# Patient Record
Sex: FEMALE | Race: WHITE | NOT HISPANIC OR LATINO
[De-identification: names, ages, dates, MRNs, and addresses within clinical notes are randomized per-mention and may not be internally consistent; named-entity substitution may affect disease eponyms.]

---

## 2023-03-30 ENCOUNTER — NON-APPOINTMENT (OUTPATIENT)
Age: 33
End: 2023-03-30

## 2023-03-30 ENCOUNTER — TRANSCRIPTION ENCOUNTER (OUTPATIENT)
Age: 33
End: 2023-03-30

## 2023-03-30 ENCOUNTER — APPOINTMENT (OUTPATIENT)
Dept: OBGYN | Facility: CLINIC | Age: 33
End: 2023-03-30
Payer: COMMERCIAL

## 2023-03-30 VITALS
WEIGHT: 125 LBS | SYSTOLIC BLOOD PRESSURE: 110 MMHG | DIASTOLIC BLOOD PRESSURE: 70 MMHG | HEIGHT: 67 IN | BODY MASS INDEX: 19.62 KG/M2

## 2023-03-30 DIAGNOSIS — Z00.00 ENCOUNTER FOR GENERAL ADULT MEDICAL EXAMINATION W/OUT ABNORMAL FINDINGS: ICD-10-CM

## 2023-03-30 DIAGNOSIS — Z11.3 ENCOUNTER FOR SCREENING FOR INFECTIONS WITH A PREDOMINANTLY SEXUAL MODE OF TRANSMISSION: ICD-10-CM

## 2023-03-30 DIAGNOSIS — Z01.419 ENCOUNTER FOR GYNECOLOGICAL EXAMINATION (GENERAL) (ROUTINE) W/OUT ABNORMAL FINDINGS: ICD-10-CM

## 2023-03-30 PROCEDURE — 99385 PREV VISIT NEW AGE 18-39: CPT

## 2023-03-30 NOTE — PHYSICAL EXAM
[Appropriately responsive] : appropriately responsive [Alert] : alert [No Acute Distress] : no acute distress [No Lymphadenopathy] : no lymphadenopathy [Soft] : soft [Non-tender] : non-tender [Non-distended] : non-distended [No Mass] : no mass [Oriented x3] : oriented x3 [Examination Of The Breasts] : a normal appearance [No Discharge] : no discharge [No Masses] : no breast masses were palpable [No Lesions] : no lesions  [Labia Majora] : normal [Labia Minora] : normal [No Bleeding] : There was no active vaginal bleeding [Normal] : normal [Normal Position] : in a normal position [Uterine Adnexae] : normal [Tenderness] : nontender [Enlarged ___ wks] : not enlarged [Mass ___ cm] : no uterine mass was palpated

## 2023-03-30 NOTE — PROCEDURE
[Cervical Pap Smear] : cervical Pap smear [Liquid Base] : liquid base [GC & Chlamydia via Pap] : GC & Chlamydia via Pap [Tolerated Well] : the patient tolerated the procedure well [No Complications] : there were no complications [de-identified] : trich

## 2023-03-30 NOTE — PLAN
[FreeTextEntry1] : Pap with G/C/T ordered\par Serum STI screen sent\par Discussed Pap guidelines \par Discussed self breast awareness, CBE and mammogram guidelines\par Mammogram due at 42 yo\par Reviewed Martin with return demonstration\par Contraception: condoms\par Condoms for STI prevention\par F/U pending results\par RTC for routine GYN exam in one year or PRN\par \par \par

## 2023-03-30 NOTE — HISTORY OF PRESENT ILLNESS
[Patient reported PAP Smear was normal] : Patient reported PAP Smear was normal [Y] : Patient is sexually active [Monogamous (Male Partner)] : is monogamous with a male partner [N] : Patient denies prior pregnancies [Normal Amount/Duration] :  normal amount and duration [Regular Cycle Intervals] : periods have been regular [Menarche Age: ____] : age at menarche was [unfilled] [Currently Active] : currently active [Men] : men [No] : No [HIV Test offered] : HIV Test offered [Syphilis test offered] : Syphilis test offered [Gonorrhea test offered] : Gonorrhea test offered [Chlamydia test offered] : Chlamydia test offered [Trichomonas test offered] : Trichomonas test offered [HPV test offered] : HPV test offered [Hepatitis B test offered] : Hepatitis B test offered [Hepatitis C test offered] : Hepatitis C test offered [Patient would like to be screened for STIs] : Patient would like to be screened for STIs [PapSmeardate] : 11/2021 [TextBox_31] : Patient reports normal [LMPDate] : 3/6/23 [MensesFreq] : 28 [MensesLength] : 5 [MensesAmount] : moderate [PGHxTotal] : 0 [FreeTextEntry1] : 3/6/23

## 2023-03-31 LAB
C TRACH RRNA SPEC QL NAA+PROBE: NOT DETECTED
HBV SURFACE AG SER QL: NONREACTIVE
HCV AB SER QL: NONREACTIVE
HCV S/CO RATIO: 0.11 S/CO
HIV1+2 AB SPEC QL IA.RAPID: NONREACTIVE
HPV HIGH+LOW RISK DNA PNL CVX: NOT DETECTED
N GONORRHOEA RRNA SPEC QL NAA+PROBE: NOT DETECTED
SOURCE AMPLIFICATION: NORMAL
SOURCE TP AMPLIFICATION: NORMAL
T PALLIDUM AB SER QL IA: NEGATIVE
T VAGINALIS RRNA SPEC QL NAA+PROBE: NOT DETECTED

## 2023-04-10 LAB — CYTOLOGY CVX/VAG DOC THIN PREP: NORMAL

## 2024-01-30 ENCOUNTER — APPOINTMENT (OUTPATIENT)
Dept: OBGYN | Facility: CLINIC | Age: 34
End: 2024-01-30
Payer: COMMERCIAL

## 2024-01-30 VITALS
OXYGEN SATURATION: 97 % | HEART RATE: 80 BPM | WEIGHT: 131 LBS | SYSTOLIC BLOOD PRESSURE: 100 MMHG | DIASTOLIC BLOOD PRESSURE: 60 MMHG | BODY MASS INDEX: 20.52 KG/M2

## 2024-01-30 PROCEDURE — 76830 TRANSVAGINAL US NON-OB: CPT

## 2024-01-30 PROCEDURE — 99202 OFFICE O/P NEW SF 15 MIN: CPT | Mod: 25

## 2024-01-30 NOTE — END OF VISIT
[] : Resident [FreeTextEntry3] : DIETARY CHANGES, EXERCISE MODIFICATIONS, PNV W/ DHA RECOMMENDED, ZIKA/TRAVEL RESTRICTIONS, COVID VACCINE DISCUSSED PRENATAL LABS URINE CULTURE VAGINAL CULTURES COLLECTED REFERRAL FOR MFM [facility]

## 2024-01-30 NOTE — PROCEDURE
[FreeTextEntry1] : Viable IUP. 1 fetus. Size c/w dates. Difference of only 1 day from LMP, so will use LMP for JORDAN confirmation.

## 2024-01-30 NOTE — HISTORY OF PRESENT ILLNESS
[Patient reported PAP Smear was normal] : Patient reported PAP Smear was normal [N] : Patient does not use contraception [Monogamous (Male Partner)] : is monogamous with a male partner [Y] : Positive pregnancy history [Normal Amount/Duration] :  normal amount and duration [Regular Cycle Intervals] : periods have been regular [Currently Active] : currently active [Men] : men [No] : No [PapSmeardate] : 3/30/23 [LMPDate] : 12/06/23 [MensesFreq] : 28 [MensesLength] : 5 [PGxTotal] : 1 [PGHxFullTerm] : 0 [PGHxPremature] : 0 [PGHxAbortions] : 0 [[facility]] : 0 [PGHxABInduced] : 0 [PGHxABSpont] : 0 [PGHxEctopic] : 0 [PGHxMultBirths] : 0 [FreeTextEntry1] : 12/06/23

## 2024-01-30 NOTE — PLAN
[FreeTextEntry1] : 33 y.o.  @7w6d by LMP (JORDAN 24) for initial prenatal visit found to have viable IUP. Pregnancy counseling preformed and all questions answered.  - Prenatal labs drawn - [facility] unit for NT scan - NIPT at next visit - RTC in 4w for routine PNC  Lindsey Tom MD PGY4 LARISSA Kaiser MD

## 2024-01-31 ENCOUNTER — NON-APPOINTMENT (OUTPATIENT)
Age: 34
End: 2024-01-31

## 2024-01-31 LAB
ABO + RH PNL BLD: NORMAL
ALBUMIN SERPL ELPH-MCNC: 4.6 G/DL
ALP BLD-CCNC: 42 U/L
ALT SERPL-CCNC: 14 U/L
ANION GAP SERPL CALC-SCNC: 11 MMOL/L
AST SERPL-CCNC: 14 U/L
BASOPHILS # BLD AUTO: 0.03 K/UL
BASOPHILS NFR BLD AUTO: 0.5 %
BILIRUB SERPL-MCNC: 0.3 MG/DL
BLD GP AB SCN SERPL QL: NORMAL
BUN SERPL-MCNC: 9 MG/DL
C TRACH RRNA SPEC QL NAA+PROBE: NOT DETECTED
CALCIUM SERPL-MCNC: 9.7 MG/DL
CHLORIDE SERPL-SCNC: 102 MMOL/L
CO2 SERPL-SCNC: 22 MMOL/L
CREAT SERPL-MCNC: 0.55 MG/DL
EGFR: 125 ML/MIN/1.73M2
EOSINOPHIL # BLD AUTO: 0.06 K/UL
EOSINOPHIL NFR BLD AUTO: 0.9 %
ESTIMATED AVERAGE GLUCOSE: 94 MG/DL
GLUCOSE SERPL-MCNC: 80 MG/DL
HBA1C MFR BLD HPLC: 4.9 %
HCT VFR BLD CALC: 39.9 %
HGB A MFR BLD: 97.5 %
HGB A2 MFR BLD: 2.5 %
HGB BLD-MCNC: 12.4 G/DL
HGB FRACT BLD-IMP: NORMAL
HIV1+2 AB SPEC QL IA.RAPID: NONREACTIVE
IMM GRANULOCYTES NFR BLD AUTO: 0.3 %
LYMPHOCYTES # BLD AUTO: 1.29 K/UL
LYMPHOCYTES NFR BLD AUTO: 20.3 %
MAN DIFF?: NORMAL
MCHC RBC-ENTMCNC: 25.3 PG
MCHC RBC-ENTMCNC: 31.1 GM/DL
MCV RBC AUTO: 81.3 FL
MONOCYTES # BLD AUTO: 0.57 K/UL
MONOCYTES NFR BLD AUTO: 9 %
N GONORRHOEA RRNA SPEC QL NAA+PROBE: NOT DETECTED
NEUTROPHILS # BLD AUTO: 4.38 K/UL
NEUTROPHILS NFR BLD AUTO: 69 %
PLATELET # BLD AUTO: 175 K/UL
POTASSIUM SERPL-SCNC: 3.9 MMOL/L
PROT SERPL-MCNC: 7.4 G/DL
RBC # BLD: 4.91 M/UL
RBC # FLD: 15.5 %
SODIUM SERPL-SCNC: 136 MMOL/L
SOURCE AMPLIFICATION: NORMAL
WBC # FLD AUTO: 6.35 K/UL

## 2024-02-05 LAB
B19V IGG SER QL IA: 4.45 INDEX
B19V IGG+IGM SER-IMP: NORMAL
B19V IGG+IGM SER-IMP: POSITIVE
B19V IGM FLD-ACNC: 0.15 INDEX
B19V IGM SER-ACNC: NEGATIVE
BACTERIA UR CULT: NORMAL
CFTR MUT TESTED BLD/T: NEGATIVE
CMV IGG SERPL QL: <0.2 U/ML
CMV IGG SERPL-IMP: NEGATIVE
CMV IGM SERPL QL: <8 AU/ML
CMV IGM SERPL QL: NEGATIVE
HBV SURFACE AG SER QL: NONREACTIVE
HCV AB SER QL: NONREACTIVE
HCV S/CO RATIO: 0.11 S/CO
RUBV IGG FLD-ACNC: 2.6 INDEX
RUBV IGG SER-IMP: POSITIVE
T GONDII AB SER-IMP: NEGATIVE
T GONDII AB SER-IMP: NEGATIVE
T GONDII IGG SER QL: <3 IU/ML
T GONDII IGM SER QL: 3.4 AU/ML
T PALLIDUM AB SER QL IA: NEGATIVE

## 2024-02-06 LAB
AR GENE MUT ANL BLD/T: NORMAL
FMR1 GENE MUT ANL BLD/T: NORMAL

## 2024-02-20 ENCOUNTER — NON-APPOINTMENT (OUTPATIENT)
Age: 34
End: 2024-02-20

## 2024-02-26 ENCOUNTER — NON-APPOINTMENT (OUTPATIENT)
Age: 34
End: 2024-02-26

## 2024-02-27 ENCOUNTER — APPOINTMENT (OUTPATIENT)
Dept: OBGYN | Facility: CLINIC | Age: 34
End: 2024-02-27
Payer: COMMERCIAL

## 2024-02-27 VITALS
WEIGHT: 134 LBS | BODY MASS INDEX: 20.99 KG/M2 | DIASTOLIC BLOOD PRESSURE: 80 MMHG | SYSTOLIC BLOOD PRESSURE: 110 MMHG | OXYGEN SATURATION: 98 %

## 2024-02-27 DIAGNOSIS — Z34.91 ENCOUNTER FOR SUPERVISION OF NORMAL PREGNANCY, UNSPECIFIED, FIRST TRIMESTER: ICD-10-CM

## 2024-02-27 PROCEDURE — 99213 OFFICE O/P EST LOW 20 MIN: CPT | Mod: 25

## 2024-02-29 ENCOUNTER — ASOB RESULT (OUTPATIENT)
Age: 34
End: 2024-02-29

## 2024-02-29 ENCOUNTER — APPOINTMENT (OUTPATIENT)
Dept: ANTEPARTUM | Facility: CLINIC | Age: 34
End: 2024-02-29
Payer: COMMERCIAL

## 2024-02-29 PROCEDURE — 76813 OB US NUCHAL MEAS 1 GEST: CPT

## 2024-02-29 PROCEDURE — 93976 VASCULAR STUDY: CPT

## 2024-03-04 ENCOUNTER — NON-APPOINTMENT (OUTPATIENT)
Age: 34
End: 2024-03-04

## 2024-03-05 ENCOUNTER — NON-APPOINTMENT (OUTPATIENT)
Age: 34
End: 2024-03-05

## 2024-03-05 LAB
MEV IGG FLD QL IA: >300 AU/ML
MEV IGG+IGM SER-IMP: POSITIVE
MUV AB SER-ACNC: NEGATIVE
MUV IGG SER QL IA: <5 AU/ML
VZV AB TITR SER: POSITIVE
VZV IGG SER IF-ACNC: 1098 INDEX

## 2024-03-06 ENCOUNTER — APPOINTMENT (OUTPATIENT)
Dept: OBGYN | Facility: CLINIC | Age: 34
End: 2024-03-06
Payer: COMMERCIAL

## 2024-03-06 ENCOUNTER — APPOINTMENT (OUTPATIENT)
Dept: ANTEPARTUM | Facility: CLINIC | Age: 34
End: 2024-03-06
Payer: COMMERCIAL

## 2024-03-06 ENCOUNTER — ASOB RESULT (OUTPATIENT)
Age: 34
End: 2024-03-06

## 2024-03-06 VITALS
HEART RATE: 84 BPM | DIASTOLIC BLOOD PRESSURE: 70 MMHG | WEIGHT: 130 LBS | SYSTOLIC BLOOD PRESSURE: 110 MMHG | BODY MASS INDEX: 20.36 KG/M2 | OXYGEN SATURATION: 99 %

## 2024-03-06 DIAGNOSIS — Z34.90 ENCOUNTER FOR SUPERVISION OF NORMAL PREGNANCY, UNSPECIFIED, UNSPECIFIED TRIMESTER: ICD-10-CM

## 2024-03-06 PROCEDURE — 76945 ECHO GUIDE VILLUS SAMPLING: CPT

## 2024-03-06 PROCEDURE — 59015 CHORION BIOPSY: CPT

## 2024-03-06 PROCEDURE — 99213 OFFICE O/P EST LOW 20 MIN: CPT | Mod: 25

## 2024-03-12 ENCOUNTER — NON-APPOINTMENT (OUTPATIENT)
Age: 34
End: 2024-03-12

## 2024-03-18 ENCOUNTER — NON-APPOINTMENT (OUTPATIENT)
Age: 34
End: 2024-03-18

## 2024-03-19 ENCOUNTER — NON-APPOINTMENT (OUTPATIENT)
Age: 34
End: 2024-03-19

## 2024-03-20 ENCOUNTER — NON-APPOINTMENT (OUTPATIENT)
Age: 34
End: 2024-03-20

## 2024-03-22 ENCOUNTER — APPOINTMENT (OUTPATIENT)
Dept: OBGYN | Facility: CLINIC | Age: 34
End: 2024-03-22
Payer: COMMERCIAL

## 2024-03-22 ENCOUNTER — NON-APPOINTMENT (OUTPATIENT)
Age: 34
End: 2024-03-22

## 2024-03-22 VITALS
BODY MASS INDEX: 20.52 KG/M2 | WEIGHT: 131 LBS | OXYGEN SATURATION: 96 % | DIASTOLIC BLOOD PRESSURE: 80 MMHG | SYSTOLIC BLOOD PRESSURE: 124 MMHG | HEART RATE: 114 BPM

## 2024-03-22 PROCEDURE — 99213 OFFICE O/P EST LOW 20 MIN: CPT

## 2024-03-25 ENCOUNTER — APPOINTMENT (OUTPATIENT)
Dept: OBGYN | Facility: CLINIC | Age: 34
End: 2024-03-25

## 2024-03-28 ENCOUNTER — APPOINTMENT (OUTPATIENT)
Dept: ANTEPARTUM | Facility: CLINIC | Age: 34
End: 2024-03-28
Payer: COMMERCIAL

## 2024-03-28 ENCOUNTER — ASOB RESULT (OUTPATIENT)
Age: 34
End: 2024-03-28

## 2024-03-28 PROCEDURE — 76805 OB US >/= 14 WKS SNGL FETUS: CPT

## 2024-03-28 PROCEDURE — 76817 TRANSVAGINAL US OBSTETRIC: CPT | Mod: 59

## 2024-03-29 ENCOUNTER — APPOINTMENT (OUTPATIENT)
Dept: ANTEPARTUM | Facility: CLINIC | Age: 34
End: 2024-03-29

## 2024-03-29 ENCOUNTER — APPOINTMENT (OUTPATIENT)
Dept: OBGYN | Facility: CLINIC | Age: 34
End: 2024-03-29
Payer: COMMERCIAL

## 2024-03-29 VITALS
BODY MASS INDEX: 21.14 KG/M2 | WEIGHT: 135 LBS | OXYGEN SATURATION: 96 % | DIASTOLIC BLOOD PRESSURE: 70 MMHG | SYSTOLIC BLOOD PRESSURE: 110 MMHG

## 2024-03-29 PROCEDURE — 99214 OFFICE O/P EST MOD 30 MIN: CPT

## 2024-03-31 LAB
BASOPHILS # BLD AUTO: 0.04 K/UL
BASOPHILS NFR BLD AUTO: 0.4 %
EOSINOPHIL # BLD AUTO: 0.11 K/UL
EOSINOPHIL NFR BLD AUTO: 1.1 %
HCT VFR BLD CALC: 37.4 %
HGB BLD-MCNC: 12.1 G/DL
IMM GRANULOCYTES NFR BLD AUTO: 1 %
LYMPHOCYTES # BLD AUTO: 1.58 K/UL
LYMPHOCYTES NFR BLD AUTO: 16.1 %
MAN DIFF?: NORMAL
MCHC RBC-ENTMCNC: 26.3 PG
MCHC RBC-ENTMCNC: 32.4 GM/DL
MCV RBC AUTO: 81.3 FL
MONOCYTES # BLD AUTO: 0.79 K/UL
MONOCYTES NFR BLD AUTO: 8.1 %
NEUTROPHILS # BLD AUTO: 7.17 K/UL
NEUTROPHILS NFR BLD AUTO: 73.3 %
PLATELET # BLD AUTO: 171 K/UL
RBC # BLD: 4.6 M/UL
RBC # FLD: 16.1 %
WBC # FLD AUTO: 9.79 K/UL

## 2024-04-01 LAB — ABO + RH PNL BLD: NORMAL

## 2024-04-11 ENCOUNTER — APPOINTMENT (OUTPATIENT)
Dept: OBGYN | Facility: CLINIC | Age: 34
End: 2024-04-11
Payer: COMMERCIAL

## 2024-04-11 VITALS
BODY MASS INDEX: 21.14 KG/M2 | SYSTOLIC BLOOD PRESSURE: 111 MMHG | DIASTOLIC BLOOD PRESSURE: 77 MMHG | WEIGHT: 135 LBS | OXYGEN SATURATION: 97 % | HEART RATE: 82 BPM

## 2024-04-11 VITALS
OXYGEN SATURATION: 99 % | DIASTOLIC BLOOD PRESSURE: 74 MMHG | RESPIRATION RATE: 16 BRPM | SYSTOLIC BLOOD PRESSURE: 105 MMHG | TEMPERATURE: 99 F | HEART RATE: 83 BPM | HEIGHT: 67 IN | WEIGHT: 134.48 LBS

## 2024-04-11 PROCEDURE — 59200 INSERT CERVICAL DILATOR: CPT

## 2024-04-11 NOTE — ASU PATIENT PROFILE, ADULT - FALL HARM RISK - UNIVERSAL INTERVENTIONS
Bed in lowest position, wheels locked, appropriate side rails in place/Call bell, personal items and telephone in reach/Instruct patient to call for assistance before getting out of bed or chair/Non-slip footwear when patient is out of bed/[place] to call system/Physically safe environment - no spills, clutter or unnecessary equipment/Purposeful Proactive Rounding/Room/bathroom lighting operational, light cord in reach

## 2024-04-11 NOTE — HISTORY OF PRESENT ILLNESS
[FreeTextEntry1] : KARL Hx: G0. Remote h/o abnormal Pap, normals since.  PMH: Denies PSH: Denies Meds: None Allergies: NKDA Social Hx: Denies tobacco, alcohol, recreational drug use.

## 2024-04-11 NOTE — PROCEDURE
[Transabdominal OB Sonogram] : Transabdominal OB Sonogram [Intrauterine Pregnancy] : intrauterine pregnancy [Fetal Heart] : fetal heart present [Current GA by Sonogram: ___ (wks)] : Current GA by Sonogram: [unfilled]Uwks [___ day(s)] : [unfilled] days [Transabdominal OB Sonogram WNL] : Transabdominal OB Sonogram WNL [FreeTextEntry1] : Placenta: posterior fundal BPD: 4.03 cm - GA 18w2d AC: 12.52 cm - GA 18w1d FL: 2.56 cm - GA 17w5d  Composite GA 18w0d

## 2024-04-11 NOTE — ASU PATIENT PROFILE, ADULT - ALCOHOL USE HISTORY SINGLE SELECT
verbal cues/nonverbal cues (demo/gestures)/2 person assist nonverbal cues (demo/gestures)/verbal cues/supervision/2 person assist never

## 2024-04-11 NOTE — ASU PATIENT PROFILE, ADULT - PAIN SCALE PREFERRED, PROFILE
Start omeprazole 20 mg daily 30 min prior to breakfast for 2 weeks- call with an update    Stop Cymbalta and start Paxil due to dry mouth and call with an update in 2 updates    May increase fiber to twice a day
Placenta Previa
numerical 0-10

## 2024-04-12 ENCOUNTER — OUTPATIENT (OUTPATIENT)
Dept: INPATIENT UNIT | Facility: HOSPITAL | Age: 34
LOS: 1 days | Discharge: HOME | End: 2024-04-12
Payer: COMMERCIAL

## 2024-04-12 ENCOUNTER — RESULT REVIEW (OUTPATIENT)
Age: 34
End: 2024-04-12

## 2024-04-12 ENCOUNTER — TRANSCRIPTION ENCOUNTER (OUTPATIENT)
Age: 34
End: 2024-04-12

## 2024-04-12 VITALS
HEART RATE: 80 BPM | RESPIRATION RATE: 20 BRPM | OXYGEN SATURATION: 98 % | TEMPERATURE: 98 F | DIASTOLIC BLOOD PRESSURE: 66 MMHG | SYSTOLIC BLOOD PRESSURE: 110 MMHG

## 2024-04-12 DIAGNOSIS — Z98.890 OTHER SPECIFIED POSTPROCEDURAL STATES: Chronic | ICD-10-CM

## 2024-04-12 LAB
BLD GP AB SCN SERPL QL: NEGATIVE — SIGNIFICANT CHANGE UP
RH IG SCN BLD-IMP: POSITIVE — SIGNIFICANT CHANGE UP

## 2024-04-12 PROCEDURE — 59841 INDUCED ABORTION DILAT&EVAC: CPT

## 2024-04-12 PROCEDURE — C9399: CPT

## 2024-04-12 PROCEDURE — 76998 US GUIDE INTRAOP: CPT | Mod: 26

## 2024-04-12 PROCEDURE — 86901 BLOOD TYPING SEROLOGIC RH(D): CPT

## 2024-04-12 PROCEDURE — 88305 TISSUE EXAM BY PATHOLOGIST: CPT

## 2024-04-12 PROCEDURE — 86850 RBC ANTIBODY SCREEN: CPT

## 2024-04-12 PROCEDURE — 88305 TISSUE EXAM BY PATHOLOGIST: CPT | Mod: 26

## 2024-04-12 PROCEDURE — 86900 BLOOD TYPING SEROLOGIC ABO: CPT

## 2024-04-12 RX ORDER — CABERGOLINE 0.5 MG/1
0.5 TABLET ORAL
Qty: 2 | Refills: 0 | Status: ACTIVE | COMMUNITY
Start: 2024-04-12 | End: 1900-01-01

## 2024-04-12 RX ORDER — ONDANSETRON 8 MG/1
8 TABLET, FILM COATED ORAL EVERY 6 HOURS
Refills: 0 | Status: DISCONTINUED | OUTPATIENT
Start: 2024-04-12 | End: 2024-04-12

## 2024-04-12 RX ORDER — ACETAMINOPHEN 500 MG
1000 TABLET ORAL EVERY 6 HOURS
Refills: 0 | Status: DISCONTINUED | OUTPATIENT
Start: 2024-04-12 | End: 2024-04-12

## 2024-04-12 RX ORDER — METOCLOPRAMIDE HCL 10 MG
10 TABLET ORAL EVERY 6 HOURS
Refills: 0 | Status: DISCONTINUED | OUTPATIENT
Start: 2024-04-12 | End: 2024-04-12

## 2024-04-12 RX ORDER — ACETAMINOPHEN 500 MG
2 TABLET ORAL
Qty: 0 | Refills: 0 | DISCHARGE
Start: 2024-04-12

## 2024-04-12 RX ORDER — SODIUM CHLORIDE 9 MG/ML
1000 INJECTION, SOLUTION INTRAVENOUS
Refills: 0 | Status: DISCONTINUED | OUTPATIENT
Start: 2024-04-12 | End: 2024-04-12

## 2024-04-12 RX ORDER — KETOROLAC TROMETHAMINE 30 MG/ML
30 SYRINGE (ML) INJECTION EVERY 6 HOURS
Refills: 0 | Status: DISCONTINUED | OUTPATIENT
Start: 2024-04-12 | End: 2024-04-12

## 2024-04-12 RX ORDER — OXYCODONE HYDROCHLORIDE 5 MG/1
5 TABLET ORAL
Refills: 0 | Status: DISCONTINUED | OUTPATIENT
Start: 2024-04-12 | End: 2024-04-12

## 2024-04-12 RX ORDER — OXYCODONE HYDROCHLORIDE 5 MG/1
10 TABLET ORAL EVERY 4 HOURS
Refills: 0 | Status: DISCONTINUED | OUTPATIENT
Start: 2024-04-12 | End: 2024-04-12

## 2024-04-12 RX ORDER — SIMETHICONE 80 MG/1
80 TABLET, CHEWABLE ORAL EVERY 6 HOURS
Refills: 0 | Status: DISCONTINUED | OUTPATIENT
Start: 2024-04-12 | End: 2024-04-12

## 2024-04-12 RX ADMIN — SODIUM CHLORIDE 125 MILLILITER(S): 9 INJECTION, SOLUTION INTRAVENOUS at 14:36

## 2024-04-12 RX ADMIN — Medication 200 MILLIGRAM(S): at 12:17

## 2024-04-12 NOTE — PLAN
[FreeTextEntry1] : Lilo presents for follow up visit, review of counseling covered at initial consultation two weeks ago, and laminaria insertion.  - Topics discussed at consultation revisited, patient has no further questions. - Plan D&E tomorrow - scheduled for 1pm at [facility] - CBC, T&S resulted  - Genetics: declines - Memory making: desires - Disposition: hospital - Contraception: declines - Cabergoline Rx sent to pharmacy  RTC tomorrow for D&E at [facility]

## 2024-04-12 NOTE — BRIEF OPERATIVE NOTE - OPERATION/FINDINGS
cervix found to be dilated 1cm  cervix serially dilated to size 18 dilator, amniotomy performed with dilator  POC removed using sopher forceps under ultrasound guidance  suction curettage performed  hemostasis and excellent uterine tone noted  methergine given prophylactically, pitocin given  bimanual massage performed cervix found to be dilated 1cm  10cc of 1% lidocaine with vasopressin injected for cervical block at 4 o clock and 8 o clock  cervix serially dilated to size 18 dilator, amniotomy performed with dilator  POC removed using sopher forceps under ultrasound guidance  suction curettage performed  hemostasis and excellent uterine tone noted  methergine given prophylactically, pitocin given  bimanual massage performed cervix found to be dilated 1cm  10cc of 1% lidocaine with vasopressin injected for cervical block at 4 o clock and 8 o clock  cervix serially dilated to size 18 dilator, amniotomy performed with dilator  POC removed using sopher forceps under ultrasound guidance  suction curettage performed  hemostasis and excellent uterine tone noted  methergine given prophylactically, pitocin given  bimanual massage performed  dictation [id number]

## 2024-04-12 NOTE — PROGRESS NOTE ADULT - ASSESSMENT
34 yo s/p D+E at 18wk. Doing well in PACU.  - Plan for 800 cc cytotec buccal due to bleeding in PACU to ensure good uterine tone  - Will reevaluate    [place] MAYSY2

## 2024-04-12 NOTE — HISTORY OF PRESENT ILLNESS
[FreeTextEntry1] : Lilo Flood is a 32 yo G1 at 16.1 weeks presenting for Complex Family Planning consultation for pregnancy options counseling.   OBGYN Hx: G0. Remote h/o abnormal Pap, normals since. PMH: Denies PSH: Denies Meds: None Allergies: NKDA Social Hx: Denies tobacco, alcohol, recreational drug use.

## 2024-04-12 NOTE — ASU DISCHARGE PLAN (ADULT/PEDIATRIC) - NS MD DC FALL RISK RISK
For information on Fall & Injury Prevention, visit: [email or web address] OR  [email or web address] OR  https://www.cdc.gov/steadi/patient.html

## 2024-04-12 NOTE — PRE-ANESTHESIA EVALUATION ADULT - NSANTHPEFT_GEN_ALL_CORE
General: Appearance is consistent with chronological age. No abnormal facies.  Airway:  See Mallampati score  EENT: Anicteric sclera; oropharynx clear, moist mucus membranes  Cardiovascular:  Regular rate and rhythm  Respiratory: Unlabored breathing  Neurological: Awake and alert, moves all extremities  Constitutional: MET>4 m

## 2024-04-12 NOTE — PROGRESS NOTE ADULT - SUBJECTIVE AND OBJECTIVE BOX
GYN POC   Patient seen at bedside.  Pain controlled. Tolerating sips.    Endorses feelings of gushes per vagina    Vital Signs Last 24 Hrs  T(C): 36.1 (12 Apr 2024 14:07), Max: 37.1 (12 Apr 2024 12:10)  T(F): 97 (12 Apr 2024 14:07), Max: 97 (12 Apr 2024 14:07)  HR: 67 (12 Apr 2024 15:22) (63 - 83)  BP: 105/72 (12 Apr 2024 15:22) (103/66 - 121/79)  BP(mean): 84 (12 Apr 2024 15:22) (79 - 96)  RR: 44 (12 Apr 2024 15:22) (16 - 44)  SpO2: 99% (12 Apr 2024 15:22) (99% - 100%)    Parameters below as of 12 Apr 2024 15:22  Patient On (Oxygen Delivery Method): room air        Physical Exam:  Gen: No Acute Distress  Pulm: Breathing comfortably  GI: soft, appropriately tender  Pelvic: ~50cc of blood on christina, small trickling of blood from vagina with fundal pressure  TAUS: small clot in uterus  Ext: SCDs in place, wnl    I&O's Summary    12 Apr 2024 07:01  -  12 Apr 2024 15:49  --------------------------------------------------------  IN: 750 mL / OUT: 0 mL / NET: 750 mL      MEDICATIONS  (STANDING):  acetaminophen     Tablet .. 1000 milliGRAM(s) Oral every 6 hours  ketorolac   Injectable 30 milliGRAM(s) IV Push every 6 hours  lactated ringers. 1000 milliLiter(s) (125 mL/Hr) IV Continuous <Continuous>    MEDICATIONS  (PRN):  acetaminophen     Tablet .. 1000 milliGRAM(s) Oral every 6 hours PRN Mild Pain (1 - 3)  metoclopramide Injectable 10 milliGRAM(s) IV Push every 6 hours PRN n/v  ondansetron Injectable 8 milliGRAM(s) IV Push every 6 hours PRN Nausea and/or Vomiting  oxyCODONE    IR 5 milliGRAM(s) Oral every 3 hours PRN Moderate Pain (4 - 6)  oxyCODONE    IR 10 milliGRAM(s) Oral every 4 hours PRN Severe Pain (7 - 10)  simethicone 80 milliGRAM(s) Chew every 6 hours PRN Gas    Allergies    No Known Allergies    Intolerances        LABS:

## 2024-04-12 NOTE — PLAN
[FreeTextEntry1] : 1.Dilation and Evacuation -All available records and ultrasounds have been reviewed - Pt does not desire induction of labor - Reviewed patient's responsibility to contact insurance company for coverage confirmation - Patient to take vacation for next week and a half, and desires termination of pregnancy after she returns. Planned laminaria insertion on 4/11/24 and D&E on 4/12/24, to be scheduled - Patient offered pamphlet for support services- patient declined - Patient offered fetal footprints- patient accepted - Genetics - declines - Reviewed disposal of remains, hospital vs. private burial. Patient desires hospital disposition - pt offered cabergoline for lactation suppression, 0.5 mg x2 to be sent to pharmacy on file on day of surgery  2. Surgery scheduling - Patient to be precertified for D+E - D+E scheduled for 4/12/24 in main operating room - No medical issues - preoperative clearance performed by surgeon  3. ID/Cervical prep - Abx to be given at insertion of laminaria and during D&E - doxycycline 200 mg in OR - Reviewed Ibuprofen 600 mg po q 6 hours post laminaria insertion and post D&E  4. Labs/Blood type - to get CBC, T+S for preop labs - Patient known Rh positive, RHIG not indicated  5. Contraception - Patient counseled on all contraceptive options - Patient does not desire post termination contraception - Patient desires pregnancy  6. Post-op - Post-operative virtual visit to be scheduled 2 weeks after operative procedure - Pre/Post-operative instructions given, reviewed bleeding and infection precautions - Provided 24 hour emergency contact phone number - All questions/concerns of patient addressed to their satisfaction  RTO in 2 weeks for lams

## 2024-04-12 NOTE — REASON FOR VISIT
[Consultation] : consultation for [FreeTextEntry2] : pregnancy options counseling [FreeTextEntry1] : Dr. Arnold Kang

## 2024-04-12 NOTE — PLAN
[FreeTextEntry1] : Lilo presents for follow up visit, review of counseling covered at initial consultation two weeks ago, and laminaria insertion.  - Topics discussed at consultation revisited, patient has no further questions. - Plan D&E tomorrow - scheduled for 1pm at [facility] - CBC, T&S resulted  - Genetics: declines - Memory making: desires - Disposition: hospital - Contraception: declines - Cabergoline Rx sent to pharmacy  RTC tomorrow for D&E at [facility]

## 2024-04-12 NOTE — ASU DISCHARGE PLAN (ADULT/PEDIATRIC) - CARE PROVIDER_API CALL
Erica Irvin  Obstetrics and Gynecology  [address]  [place] [address]  Phone: [phone]  Fax: [phone]  Follow Up Time:

## 2024-04-18 LAB — SURGICAL PATHOLOGY STUDY: SIGNIFICANT CHANGE UP

## 2024-05-01 ENCOUNTER — APPOINTMENT (OUTPATIENT)
Dept: ANTEPARTUM | Facility: CLINIC | Age: 34
End: 2024-05-01

## 2024-05-02 ENCOUNTER — APPOINTMENT (OUTPATIENT)
Dept: OBGYN | Facility: CLINIC | Age: 34
End: 2024-05-02
Payer: COMMERCIAL

## 2024-05-02 VITALS
DIASTOLIC BLOOD PRESSURE: 75 MMHG | BODY MASS INDEX: 21.19 KG/M2 | OXYGEN SATURATION: 97 % | HEART RATE: 92 BPM | SYSTOLIC BLOOD PRESSURE: 104 MMHG | WEIGHT: 135 LBS | HEIGHT: 67 IN

## 2024-05-02 DIAGNOSIS — Z33.2 ENCOUNTER FOR ELECTIVE TERMINATION OF PREGNANCY: ICD-10-CM

## 2024-05-02 PROCEDURE — 99213 OFFICE O/P EST LOW 20 MIN: CPT | Mod: 25

## 2024-05-02 PROCEDURE — 76830 TRANSVAGINAL US NON-OB: CPT

## 2024-05-02 NOTE — HISTORY OF PRESENT ILLNESS
[FreeTextEntry1] : Lilo presents for postop visit. She has been doing well since her procedure. She is surrounded by excellent social support. She denies pain, is having a little bit of spotting currently but denies heavy bleeding. Denies fevers or chills. She is completing all of her ADLs without issue.

## 2024-05-02 NOTE — PLAN
[FreeTextEntry1] : 1.Dilation and Evacuation -The patient is recovering well. No signs/symptoms of infection. - TVUS done and WNL -Reviewed pathology from procedure -Reviewed that first period may be heavier than normal. -Patient is cleared to return to all physical activities  2.Contraception - Reviewed contraceptive options. Patient declines - Patient desires pregnancy, discussed waiting at least one menses prior to attempting to conceive  3. Psych - Patient doing extremely well - Discussed normal grieving process, reviewed support people  4. Follow-up - Follow up PRN or with primary OBGYN - All questions/concerns addressed of the patient to their satisfaction

## (undated) DEVICE — VACUUM CURETTE BERKLEY OLYMPUS CURVED 9MM

## (undated) DEVICE — PACK D&C

## (undated) DEVICE — VACUUM CURETTE BERKLEY OLYMPUS CURVED 11MM

## (undated) DEVICE — VENODYNE/SCD SLEEVE CALF MEDIUM

## (undated) DEVICE — VACUUM CURETTE BUSSE HOSP CURVED 12MM

## (undated) DEVICE — DRSG TELFA 3 X 8

## (undated) DEVICE — GLV 6.5 PROTEXIS (WHITE)

## (undated) DEVICE — VACUUM CURETTE MEDGYN CURVED 13MM

## (undated) DEVICE — CANISTER SUCTION VAC

## (undated) DEVICE — VACUUM CURETTE BERKLEY OLYMPUS CURVED 12MM

## (undated) DEVICE — TUBING SUCTION CONNECTOR UTERINE ASPIRATION UVAC 0.5" X 6FT

## (undated) DEVICE — VACUUM CURETTE BERKLEY OLYMPUS CURVED 8MM

## (undated) DEVICE — VACUUM CURETTE BERKLEY OLYMPUS CURVED 7MM

## (undated) DEVICE — WARMING BLANKET UPPER ADULT